# Patient Record
(demographics unavailable — no encounter records)

---

## 2025-05-14 NOTE — DATA REVIEWED
[FreeTextEntry1] : Left Shoulder X-ray 2V: AP/Outlet taken & interpreted on 5/14/25: The clinically significant findings include the GH joint is ok. There are slight AC changes.   Left Scapula X-ray 2V: Zanca/Axillary taken & interpreted on 5/14/25: The clinically significant findings include There is a type I - II acromion.

## 2025-05-14 NOTE — REASON FOR VISIT
[FreeTextEntry2] : NEW PATIENT 5/14/25: This is a 65 year old RHD male  with left shoulder pain since March 2025 without injury. The pain started after he had an IV in that arm for a CAT scan. He also had two biopsies in the axillary and arm area, which were -sy.  No prior history/treatment. Reaching is uncomfortable. Night symptoms can occur. There is no n/t. No meds. He has been treated for 4 different cancers since April 2021.  On 1/5/21, Dr. Aguilar performed right shoulder arthroscopy which included GHD, BRENDA, revision SAD, revision RCR, revision DCR. He recovered well.

## 2025-05-14 NOTE — HISTORY OF PRESENT ILLNESS
[4] : 4 [7] : 7 [Sharp] : sharp [Constant] : constant [Sleep] : sleep [Heat] : heat [Full time] : Work status: full time [de-identified] : Left shoulder pain for a few months. No specific injury.  [] : no [FreeTextEntry1] : Left shoulder [FreeTextEntry7] : to the elbow [de-identified] :

## 2025-05-14 NOTE — PHYSICAL EXAM
[Left] : left shoulder [Sitting] : sitting [Minimal] : minimal [5 ___] : forward flexion 5[unfilled]/5 [5___] : external rotation 5[unfilled]/5 [Right] : right shoulder [] : healed incision [TWNoteComboBox4] : passive forward flexion 150 degrees [TWNoteComboBox6] : internal rotation L3 [de-identified] : external rotation 60 degrees

## 2025-05-14 NOTE — ASSESSMENT
[FreeTextEntry1] : __ We discussed the underlying pathology. Treatment options reviewed. Naprosyn 375mg would be prescribed, but is contraindicated due to elevated creatinine.  A Medrol dose pack is prescribed, dispense 1, to be taken as directed, with risks of GI symptoms reviewed. If symptoms persist, an MRI is planned.  He will call for follow up. Cautions discussed. Questions answered.  Patient was seen by Dr. Jayme Aguilar, who determined the assessment and plan. Melissa DE LA CRUZ, am scribing for Dr. Jayme Aguilar in his presence for the chief complaint, physical exam, studies, assessment, and/or plan.